# Patient Record
Sex: FEMALE | Race: WHITE | URBAN - METROPOLITAN AREA
[De-identification: names, ages, dates, MRNs, and addresses within clinical notes are randomized per-mention and may not be internally consistent; named-entity substitution may affect disease eponyms.]

---

## 2019-12-09 ENCOUNTER — OFFICE VISIT (OUTPATIENT)
Dept: FAMILY MEDICINE | Facility: CLINIC | Age: 24
End: 2019-12-09
Payer: COMMERCIAL

## 2019-12-09 VITALS
DIASTOLIC BLOOD PRESSURE: 67 MMHG | TEMPERATURE: 98.6 F | RESPIRATION RATE: 17 BRPM | HEIGHT: 67 IN | HEART RATE: 98 BPM | BODY MASS INDEX: 24.01 KG/M2 | OXYGEN SATURATION: 96 % | SYSTOLIC BLOOD PRESSURE: 98 MMHG | WEIGHT: 153 LBS

## 2019-12-09 DIAGNOSIS — R10.2 PELVIC PAIN IN FEMALE: Primary | ICD-10-CM

## 2019-12-09 DIAGNOSIS — R19.7 DIARRHEA, UNSPECIFIED TYPE: ICD-10-CM

## 2019-12-09 PROBLEM — F41.9 ANXIETY: Status: ACTIVE | Noted: 2019-12-09

## 2019-12-09 LAB
ALBUMIN SERPL-MCNC: 3.7 G/DL (ref 3.4–5)
ALP SERPL-CCNC: 76 U/L (ref 40–150)
ALT SERPL W P-5'-P-CCNC: 21 U/L (ref 0–50)
AMYLASE SERPL-CCNC: 39 U/L (ref 30–110)
ANION GAP SERPL CALCULATED.3IONS-SCNC: 3 MMOL/L (ref 3–14)
AST SERPL W P-5'-P-CCNC: 15 U/L (ref 0–45)
BASOPHILS # BLD AUTO: 0.1 10E9/L (ref 0–0.2)
BASOPHILS NFR BLD AUTO: 0.5 %
BILIRUB SERPL-MCNC: 0.4 MG/DL (ref 0.2–1.3)
BILIRUBIN UR: NEGATIVE MG/DL
BLOOD UR: NEGATIVE MG/DL
BUN SERPL-MCNC: 11 MG/DL (ref 7–30)
C DIFF TOX B STL QL: NEGATIVE
CALCIUM SERPL-MCNC: 9.1 MG/DL (ref 8.5–10.1)
CHLORIDE SERPL-SCNC: 106 MMOL/L (ref 94–109)
CLARITY, URINE: CLEAR
CO2 SERPL-SCNC: 28 MMOL/L (ref 20–32)
COLOR UR: YELLOW
CREAT SERPL-MCNC: 0.6 MG/DL (ref 0.52–1.04)
CRP SERPL-MCNC: 49 MG/L (ref 0–8)
DIFFERENTIAL METHOD BLD: NORMAL
EOSINOPHIL # BLD AUTO: 0 10E9/L (ref 0–0.7)
EOSINOPHIL NFR BLD AUTO: 0.2 %
ERYTHROCYTE [DISTWIDTH] IN BLOOD BY AUTOMATED COUNT: 12.2 % (ref 10–15)
ERYTHROCYTE [SEDIMENTATION RATE] IN BLOOD BY WESTERGREN METHOD: 14 MM/H (ref 0–20)
GFR SERPL CREATININE-BSD FRML MDRD: >90 ML/MIN/{1.73_M2}
GLUCOSE SERPL-MCNC: 112 MG/DL (ref 70–99)
GLUCOSE URINE: NEGATIVE
HCT VFR BLD AUTO: 41.4 % (ref 35–47)
HGB BLD-MCNC: 13.7 G/DL (ref 11.7–15.7)
IMM GRANULOCYTES # BLD: 0 10E9/L (ref 0–0.4)
IMM GRANULOCYTES NFR BLD: 0.3 %
KETONES UR QL: NEGATIVE MG/DL
LACTOFERRIN STL QL IA: POSITIVE
LEUKOCYTE ESTERASE UR: NEGATIVE
LIPASE SERPL-CCNC: 96 U/L (ref 73–393)
LYMPHOCYTES # BLD AUTO: 1.1 10E9/L (ref 0.8–5.3)
LYMPHOCYTES NFR BLD AUTO: 11 %
MCH RBC QN AUTO: 30 PG (ref 26.5–33)
MCHC RBC AUTO-ENTMCNC: 33.1 G/DL (ref 31.5–36.5)
MCV RBC AUTO: 91 FL (ref 78–100)
MONOCYTES # BLD AUTO: 0.4 10E9/L (ref 0–1.3)
MONOCYTES NFR BLD AUTO: 4.3 %
NEUTROPHILS # BLD AUTO: 8 10E9/L (ref 1.6–8.3)
NEUTROPHILS NFR BLD AUTO: 83.7 %
NITRITE UR QL STRIP: NEGATIVE MG/DL
NRBC # BLD AUTO: 0 10*3/UL
NRBC BLD AUTO-RTO: 0 /100
PH UR STRIP: 7.5 [PH] (ref 4.5–8)
PLATELET # BLD AUTO: 266 10E9/L (ref 150–450)
POTASSIUM SERPL-SCNC: 4.5 MMOL/L (ref 3.4–5.3)
PROT SERPL-MCNC: 7.2 G/DL (ref 6.8–8.8)
PROTEIN UR: NORMAL MG/DL
RBC # BLD AUTO: 4.57 10E12/L (ref 3.8–5.2)
SODIUM SERPL-SCNC: 137 MMOL/L (ref 133–144)
SP GR UR STRIP: 1 (ref 1–1)
SPECIMEN SOURCE: NORMAL
TSH SERPL DL<=0.005 MIU/L-ACNC: 0.86 MU/L (ref 0.4–4)
UROBILINOGEN UR STRIP-ACNC: NORMAL E.U./DL
WBC # BLD AUTO: 9.6 10E9/L (ref 4–11)

## 2019-12-09 RX ORDER — DESOGESTREL AND ETHINYL ESTRADIOL 21-5 (28)
1 KIT ORAL DAILY
COMMUNITY

## 2019-12-09 RX ORDER — SERTRALINE HYDROCHLORIDE 25 MG/1
25 TABLET, FILM COATED ORAL DAILY
COMMUNITY

## 2019-12-09 SDOH — HEALTH STABILITY: MENTAL HEALTH: HOW MANY STANDARD DRINKS CONTAINING ALCOHOL DO YOU HAVE ON A TYPICAL DAY?: 1 OR 2

## 2019-12-09 SDOH — HEALTH STABILITY: MENTAL HEALTH: HOW OFTEN DO YOU HAVE 6 OR MORE DRINKS ON ONE OCCASION?: NEVER

## 2019-12-09 SDOH — HEALTH STABILITY: MENTAL HEALTH: HOW OFTEN DO YOU HAVE A DRINK CONTAINING ALCOHOL?: MONTHLY OR LESS

## 2019-12-09 ASSESSMENT — ANXIETY QUESTIONNAIRES
7. FEELING AFRAID AS IF SOMETHING AWFUL MIGHT HAPPEN: SEVERAL DAYS
6. BECOMING EASILY ANNOYED OR IRRITABLE: SEVERAL DAYS
3. WORRYING TOO MUCH ABOUT DIFFERENT THINGS: SEVERAL DAYS
1. FEELING NERVOUS, ANXIOUS, OR ON EDGE: SEVERAL DAYS
GAD7 TOTAL SCORE: 5
IF YOU CHECKED OFF ANY PROBLEMS ON THIS QUESTIONNAIRE, HOW DIFFICULT HAVE THESE PROBLEMS MADE IT FOR YOU TO DO YOUR WORK, TAKE CARE OF THINGS AT HOME, OR GET ALONG WITH OTHER PEOPLE: NOT DIFFICULT AT ALL
5. BEING SO RESTLESS THAT IT IS HARD TO SIT STILL: NOT AT ALL
2. NOT BEING ABLE TO STOP OR CONTROL WORRYING: NOT AT ALL

## 2019-12-09 ASSESSMENT — PATIENT HEALTH QUESTIONNAIRE - PHQ9
5. POOR APPETITE OR OVEREATING: SEVERAL DAYS
SUM OF ALL RESPONSES TO PHQ QUESTIONS 1-9: 0

## 2019-12-09 ASSESSMENT — MIFFLIN-ST. JEOR: SCORE: 1466.63

## 2019-12-09 NOTE — PROGRESS NOTES
"       HPI       Aileen Tapia is a 24 year old  who presents for   Chief Complaint   Patient presents with     Fever     Pt. presents to the clinic today with stomach cramping, diarrhea this morning, headache woke up at 2 am feeling sick.        Kimberlee presents to clinic today for evaluation of GI changes with new onset fever. Two weeks ago on 11/25/19, noted feeling constipated and took one OTC laxative tablet which caused diarrhea, an unusual response for her. Diarrhea lasted one day and resolved spontaneously. On Thursday 11/28/19 developed \"oily\" stools with reported visible drops of oil in the toilet water. Again, these symptoms resolved spontaneously without further intervention after 1-2 days. Now since yesterday has developed lower intestinal cramping, foul smelling flatulence, and has felt feverish with chills. Does not have thermometer at home. Has had diarrhea as well this morning. Lower abdominal discomfort is relieved with either walking around or laying down. Has not tried any additional remedies.     Denies any nausea, vomiting, blood in stool. Denies any dysuria, hematuria,urinary urgency or frequency, abnormal genial lesions or sores. Is  and in a monogamous relationship. Is due for pap. Denies any upper abdominal pain, heart burn, or regurgitation. Pain does not wake her at nighttime. Has not been on any antibiotics recently. 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 Has not eaten gera lettuce to the best of her knowledge, has not had any undercooked meats or eaten gera lettuce (currently recalled by CDC for contamination).    Has had previous GI issues with an esophageal \"gurgle\" that has been happening since age 13. Has had evaluated extensively with  [facility] and has been unknown etiology. At times it can limit her fluid intake because if she drinks when it happens can turn into more acidy symptoms. Also has history of positive FIT testing with " "inconclusive colonoscopy as prep was incomplete d/t her vomiting.     Problem, Medication and Allergy Lists were       Current Outpatient Medications   Medication Sig Dispense Refill     desogestrel-ethinyl estradiol (KARIVA) 0.15-0.02/0.01 MG (21/5) tablet Take 1 tablet by mouth daily       sertraline (ZOLOFT) 25 MG tablet Take 25 mg by mouth daily           Allergies   Allergen Reactions     Amoxicillin      Penicillins      Sulfa Drugs    .    Patient is a new patient to this clinic and so  I reviewed/updated the Past Medical History, the Family History and the Social History .         Review of Systems:   Review of Systems         Physical Exam:     Vitals:    12/09/19 1031 12/09/19 1042   BP: 98/67    BP Location: Left arm    Patient Position: Chair    Cuff Size: Adult Regular    Pulse: 112 98   Resp: 17    Temp: 98.6  F (37  C)    TempSrc: Oral    SpO2: 96%    Weight: 69.4 kg (153 lb)    Height: 1.702 m (5' 7\")      Body mass index is 23.96 kg/m .  Vitals were reviewed and were normal     Physical Exam  Vitals signs and nursing note reviewed.   Constitutional:       General: She is not in acute distress.     Appearance: Normal appearance. She is normal weight. She is not ill-appearing, toxic-appearing or diaphoretic.   HENT:      Head: Normocephalic and atraumatic.      Nose: Nose normal.      Mouth/Throat:      Mouth: Mucous membranes are moist.      Pharynx: Oropharynx is clear. No oropharyngeal exudate or posterior oropharyngeal erythema.   Eyes:      General:         Right eye: No discharge.         Left eye: No discharge.      Conjunctiva/sclera: Conjunctivae normal.      Pupils: Pupils are equal, round, and reactive to light.   Cardiovascular:      Rate and Rhythm: Normal rate and regular rhythm.      Pulses: Normal pulses.      Heart sounds: Normal heart sounds. No murmur. No friction rub. No gallop.    Pulmonary:      Effort: Pulmonary effort is normal. No respiratory distress.      Breath sounds: " Normal breath sounds. No stridor. No wheezing, rhonchi or rales.   Chest:      Chest wall: No tenderness.   Abdominal:      General: Abdomen is flat. Bowel sounds are normal. There is no distension.      Palpations: Abdomen is soft. There is no mass.      Tenderness: There is no abdominal tenderness. There is no right CVA tenderness, left CVA tenderness, guarding or rebound.      Hernia: No hernia is present.   Skin:     General: Skin is warm and dry.      Coloration: Skin is not jaundiced or pale.      Findings: No bruising, erythema or lesion.   Neurological:      Mental Status: She is alert and oriented to person, place, and time. Mental status is at baseline.   Psychiatric:         Mood and Affect: Mood normal.         Thought Content: Thought content normal.             Results:   Results are ordered and pending  UA, TSH, stool studies (ova and parasite, fecal lactoferrin, enteric bacteria and virus, cryptosporidium/giardia, clostridium difficile), ESR, CRP, CBC, CMP, amylase, and lipase  Assessment and Plan        1. Pelvic pain in female  - Urinalysis, Micro If (UA) ([facility])    2. Diarrhea, unspecified type  With fever, low abdominal cramping/pain with acute onset. Cannot rule out potential infectious etiology versus inflammatory with previous positive FIT testing and inconclusive colonoscopy. Discussed with patient and will proceed with lab work and once resulted formulate plan based on results and current symptoms.  Discussed red flag symptoms and when patient should seek immediate medical attention. Aileen Tapia verbalized understanding.   - Comprehensive metabolic panel - Results > 1hr  - TSH with free T4 reflex  - Clostridium difficile toxin B PCR; Future  - Ova and Parasite Exam Routine; Future  - Enteric Bacteria and Virus Panel by [facility] Stool; Future  - Cryptosporidium/Giardia Immunoassay; Future  - CBC with platelets differential  - Erythrocyte sedimentation rate auto  - CRP inflammation  -  Fecal Lactoferrin; Future  - Lipase  - Amylase  - Fecal Lactoferrin  - Cryptosporidium/Giardia Immunoassay  - Enteric Bacteria and Virus Panel by [facility] Stool  - Ova and Parasite Exam Routine  - Clostridium difficile toxin B PCR       There are no discontinued medications.    Options for treatment and follow-up care were reviewed with the patient. Aileen Tapia  engaged in the decision making process and verbalized understanding of the options discussed and agreed with the final plan.    MISA Keller CNP

## 2019-12-09 NOTE — NURSING NOTE
"24 year old  Chief Complaint   Patient presents with     Fever     Pt. presents to the clinic today with stomach cramping, diarrhea this morning, headache woke up at 2 am feeling sick.        Blood pressure 98/67, pulse 112, temperature 98.6  F (37  C), temperature source Oral, resp. rate 17, height 1.702 m (5' 7\"), weight 69.4 kg (153 lb), SpO2 96 %. Body mass index is 23.96 kg/m .  BP completed using cuff size:    There is no problem list on file for this patient.      Wt Readings from Last 2 Encounters:   12/09/19 69.4 kg (153 lb)     BP Readings from Last 3 Encounters:   12/09/19 98/67       Allergies   Allergen Reactions     Amoxicillin      Penicillins      Sulfa Drugs        Current Outpatient Medications   Medication     desogestrel-ethinyl estradiol (KARIVA) 0.15-0.02/0.01 MG (21/5) tablet     sertraline (ZOLOFT) 25 MG tablet     No current facility-administered medications for this visit.        Social History     Tobacco Use     Smoking status: Never Smoker     Smokeless tobacco: Never Used   Substance Use Topics     Alcohol use: Yes     Frequency: Monthly or less     Drinks per session: 1 or 2     Binge frequency: Never     Drug use: Never       Honoring Choices - Health Care Directive Guide offered to patient at time of visit.    Health Maintenance Due   Topic Date Due     PREVENTIVE CARE VISIT  1993     HPV IMMUNIZATION (1 - Female 2-dose series) 05/01/2004     DTAP/TDAP/TD IMMUNIZATION (1 - Tdap) 05/01/2004     HIV SCREENING  05/01/2008     PAP  05/01/2014     PHQ-2  01/01/2019     INFLUENZA VACCINE (1) 09/01/2019         There is no immunization history on file for this patient.    No results found for: PAP      No lab results found.    PHQ-2 ( 1999 Pfizer) 12/9/2019   Q1: Little interest or pleasure in doing things 0   Q2: Feeling down, depressed or hopeless 0   PHQ-2 Score 0       PHQ-9 SCORE 12/9/2019   PHQ-9 Total Score 0       RENETTA-7 SCORE 12/9/2019   Total Score 5       No flowsheet data " found.    Yaz Berrios CMA  December 9, 2019 10:35 AM

## 2019-12-10 ENCOUNTER — TELEPHONE (OUTPATIENT)
Dept: FAMILY MEDICINE | Facility: CLINIC | Age: 24
End: 2019-12-10

## 2019-12-10 LAB
C COLI+JEJUNI+LARI FUSA STL QL NAA+PROBE: NOT DETECTED
C PARVUM AG STL QL IA: NEGATIVE
EC STX1 GENE STL QL NAA+PROBE: NOT DETECTED
EC STX2 GENE STL QL NAA+PROBE: NOT DETECTED
ENTERIC PATHOGEN COMMENT: ABNORMAL
G LAMBLIA AG STL QL IA: NEGATIVE
NOROV GI+II ORF1-ORF2 JNC STL QL NAA+PR: NOT DETECTED
O+P STL MICRO: NORMAL
RVA NSP5 STL QL NAA+PROBE: NOT DETECTED
SALMONELLA SP RPOD STL QL NAA+PROBE: NOT DETECTED
SHIGELLA SP+EIEC IPAH STL QL NAA+PROBE: ABNORMAL
SPECIMEN SOURCE: NORMAL
SPECIMEN SOURCE: NORMAL
V CHOL+PARA RFBL+TRKH+TNAA STL QL NAA+PR: NOT DETECTED
Y ENTERO RECN STL QL NAA+PROBE: NOT DETECTED

## 2019-12-10 ASSESSMENT — ANXIETY QUESTIONNAIRES: GAD7 TOTAL SCORE: 5

## 2019-12-10 NOTE — TELEPHONE ENCOUNTER
"Aileen returned call. Informed her not all of the lab results were back from yesterday and are still waiting on enteric stool and ova/parasite results. Thus far has had positive CRP and fecal lactoferrin results.     Pending further results may be inflammatory bowel disease, virus, or bacterial infection.     Aileen reports had fever last night of up to 102, has been taking motrin and is down to 99.7 with medication. Diarrhea and lower abdominal cramping has continued and has felt lightheaded, fell and \"bit off a chunk of my cheek\". Asked if patient could come to clinic for further evaluation today. Stated she would check with her  to see if he can drive her. Encouraged her to be evaluated if at all possible.   Adraine Young, APRN CNP  December 10, 2019    "

## 2019-12-11 ENCOUNTER — TELEPHONE (OUTPATIENT)
Dept: FAMILY MEDICINE | Facility: CLINIC | Age: 24
End: 2019-12-11

## 2019-12-11 DIAGNOSIS — A03.9 SHIGELLA INFECTION: Primary | ICD-10-CM

## 2019-12-11 RX ORDER — AZITHROMYCIN 500 MG/1
500 TABLET, FILM COATED ORAL DAILY
Qty: 3 TABLET | Refills: 0 | Status: SHIPPED | OUTPATIENT
Start: 2019-12-11 | End: 2019-12-14

## 2019-12-11 NOTE — TELEPHONE ENCOUNTER
Returned call to Aileen; continues to have fevers and diarrhea. May have consumed gera that was recalled.     Informed her of positive stool culture of Shigella toxin findings. Reviewed with Aileen how she may have been infected and cautioned on spreading to her . Reviewed treatment options, confirmed allergies to PCN and sulfa. Will treat with azithromycin 500mg x 3 days and plan to follow up on Friday to ensure she is improving. Reviewed side effects of medication, risks and benefits, and proper medication administration. Discussed red flag symptoms and when patient should seek immediate medical attention. Aileen Tapia verbalized understanding.   Adriane Young, APRN CNP  December 11, 2019

## 2019-12-11 NOTE — TELEPHONE ENCOUNTER
Pt. Calls the clinic today wanting to speak to Sugey about her symptoms. Pt. States she may have eaten a salad with Mu in it last Friday.   She is still having fevers and diarrhea. She is following the BRAT diet.     She would like a call back.   She can be reached at [phone]  Yaz Berrios CMA